# Patient Record
Sex: FEMALE | Race: OTHER | Employment: PART TIME | URBAN - METROPOLITAN AREA
[De-identification: names, ages, dates, MRNs, and addresses within clinical notes are randomized per-mention and may not be internally consistent; named-entity substitution may affect disease eponyms.]

---

## 2018-04-02 PROBLEM — I10 ESSENTIAL HYPERTENSION: Status: ACTIVE | Noted: 2018-04-02

## 2018-08-02 PROBLEM — I10 ESSENTIAL HYPERTENSION: Chronic | Status: ACTIVE | Noted: 2018-04-02

## 2019-02-14 PROBLEM — R31.29 MICROSCOPIC HEMATURIA: Status: ACTIVE | Noted: 2019-02-14

## 2021-11-02 ENCOUNTER — HOSPITAL ENCOUNTER (OUTPATIENT)
Dept: PHYSICAL THERAPY | Age: 41
Setting detail: THERAPIES SERIES
Discharge: HOME | End: 2021-11-02
Payer: COMMERCIAL

## 2021-11-02 PROCEDURE — 97110 THERAPEUTIC EXERCISES: CPT

## 2021-11-02 PROCEDURE — 97161 PT EVAL LOW COMPLEX 20 MIN: CPT

## 2021-11-02 PROCEDURE — 97140 MANUAL THERAPY 1/> REGIONS: CPT
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
## 2021-11-10 ENCOUNTER — HOSPITAL ENCOUNTER (OUTPATIENT)
Dept: PHYSICAL THERAPY | Age: 41
Setting detail: THERAPIES SERIES
Discharge: HOME | End: 2021-11-10
Payer: COMMERCIAL

## 2021-11-10 PROCEDURE — 97016 VASOPNEUMATIC DEVICE THERAPY: CPT

## 2021-11-10 PROCEDURE — 97110 THERAPEUTIC EXERCISES: CPT

## 2021-11-10 PROCEDURE — 97140 MANUAL THERAPY 1/> REGIONS: CPT

## 2021-11-10 NOTE — FLOWSHEET NOTE
Med/lat, superior/inferior                      Specific Instructions for next treatment: REVIEW HEP PRN, ERICKA, SHOULDER ISOMETRIC STRENGTHENING ALL PLANES, T BAND ROW AND PULL DOWNS, BALL ON TABLE FOR ROM, MANUAL STRETCHES AND MOBILIZATIONS OF L SHOULDER, MODALITIES FOR PAIN SUCH AS CP, VASO, ESTIM    Assessment: [] Progressing toward goals. [] No change. [] Other:    [x] Patient would continue to benefit from skilled physical therapy services in order to: INCREASE L UE STRENGTH AND ROM, DECREASED PAIN, IMPROVE POSTURE AND INCREASE FUNCTION. 11/10:  Began session with PROM stretches and manual therapy to L shoulder, L pec and L scapula to decrease tightness and to improve ROM. Added UT and levator stretch this session to improve cervical ROM. Patient reporting that she has always had decreased ROM with SB and rotation to R side. Completed session with vaso for edema and pain management. STG: (to be met in 12 treatments)  1. ? Pain: NO GREATER THAN 4/10 AT NIGHT FOR IMPROVED SLEEP  2. ? ROM: PROM SHOULDER FLEX =/> 140*  3. ? Strength: 3/5 L SHOULDER IN AVAILABLE ROM  4. ? Function: IMPROVE DASH RAW SCORE (42) BY 14  5. Independent with Home Exercise Programs  LTG: (to be met in TBD treatments)  1.    Patient goals: LESS PAIN , BE ABLE TO MOVE ARM    Pt. Education:  [x] Yes  [] No  [] Reviewed Prior HEP/Ed  Method of Education: [x] Verbal  [] Demo  [] Written  Comprehension of Education:  [x] Verbalizes understanding. [] Demonstrates understanding. [] Needs review. [] Demonstrates/verbalizes HEP/Ed previously given. Plan: [x] Continue per plan of care.    [] Other:      Treatment Charges: Mins Units   []  Modalities     [x]  Ther Exercise 15 1   [x]  Manual Therapy 30 2   []  Ther Activities     []  Aquatics     []  Neuromuscular     [x] Vasocompression 15 1   [] Gait Training     [] Dry needling        [] 1 or 2 muscles        [] 3 or more muscles     []  Other     Total Treatment time 60 4 Time In:0900            Time Out: 1000    Electronically signed by:  Ranjith Moore PTA

## 2021-11-12 ENCOUNTER — HOSPITAL ENCOUNTER (OUTPATIENT)
Dept: PHYSICAL THERAPY | Age: 41
Setting detail: THERAPIES SERIES
Discharge: HOME | End: 2021-11-12
Payer: COMMERCIAL

## 2021-11-12 PROCEDURE — 97140 MANUAL THERAPY 1/> REGIONS: CPT

## 2021-11-12 PROCEDURE — 97110 THERAPEUTIC EXERCISES: CPT

## 2021-11-12 NOTE — FLOWSHEET NOTE
[address] include 234 beds at the [facility] Outpatient Physical Therapy   [facility]   Phone: [phone]   Fax: [phone]    Physical Therapy Daily Treatment Note      Date:  2021  Patient Name:  Digna Wheeler    :  1980  MRN: [id number]  Physician: Arnoldo Grande MD  Insurance: NO INSURANCE INFORMATION AVAILABLE TO DATE  Medical Diagnosis/Rehab Codes:   M75.102 -R-C TER L SHOULDER  S42.272 FRACTURE UPPER LEFT HUMERUS  M25.512 (ICD-10-CM) - Pain in left shoulder  M24.812 -L SHOULDER STIFFNESS  M62.81 -MUSCLE WEAKNESS  Onset Date: 10/2/2021                      Next 's appt: 10/4/2021  Visit# / total visits:   Cancels/No Shows: 0/0    Subjective: PATIENT REPORTS TODAY THAT SHOULDER CONTINUES TO BE PAINFUL. DESCRIBED PAIN AS DULL ACHE THAT BECOMES MORE SHARP WITH MOVEMENT. Pain:  [x] Yes  [] No Location: L neck  Pain Rating: (0-10 scale) 6/10  Pain altered Tx:  [] No  [] Yes  Action:  Comments:    Objective:  Modalities: CONSIDER ESTIM, VASO AND/OR CP -PATIENT DECLINED VASO TODAY  Game Ready 11/10/21  - temp: 34 degrees   - location: L shoulder  - position:seated  - time: 15 minutes   - pressure level: Low     Precautions: NONE IDENTIFIED  Exercises: INSTRUCTION IN HEP 2021  Exercise Reps/ Time Weight/ Level Comments completed   PENDULUMS: CIRCLES, FWD-BACK AND LATERAL 10 EA   BID HEP    TABLE SLIDES: FLEXION, SCAPTION AND ABDUCTION 5\"X20 EA   BID HEP X   AAROM SHOULDER ER WITH WAND IN SITTING 5\"X10   BID HEP    SCAPULAR ADDUCTION 5\"X10   BID HEP; 11/10 15x               PROM 10 MIN TOTAL  FLEXION, ER, SCAPTION X   UT stretch 2x30\"      Levator stretch 2x30\"                    Other: ENCOURAGE THER IN ROM FOR STRETCHES WITH MINIMAL INCREASE IN PAIN. ENCOURAGED CONTINUED ICE SEVERAL TIMES A DAY. Manual Therapy  Time/reps:  Total time: 27'  Completed  Comments    distraction  X    Grade I oscillation to [facility] joint   Inferior and posterior   Trigger point   pec and sub scap Scapular mobilization  X Med/lat, superior/inferior                      Specific Instructions for next treatment: REVIEW HEP PRN, ERICKA, SHOULDER ISOMETRIC STRENGTHENING ALL PLANES, T BAND ROW AND PULL DOWNS, BALL ON TABLE FOR ROM, MANUAL STRETCHES AND MOBILIZATIONS OF L SHOULDER, MODALITIES FOR PAIN SUCH AS CP, VASO, ESTIM    Assessment: [x] Progressing toward goals. FOCUSED TODAY'S SESSION ON IMPROVING ROM WITH COMBINATION OF MANUAL AND AAROM EXERCISES. PAIN CONTINUES TO BE LIMITING FACTOR WITH ANY MOTION. PATIENT DECLINED VASO POST EXERCISES FOR PAIN MANAGEMENT. [] No change. [] Other:    [x] Patient would continue to benefit from skilled physical therapy services in order to: INCREASE L UE STRENGTH AND ROM, DECREASED PAIN, IMPROVE POSTURE AND INCREASE FUNCTION. STG: (to be met in 12 treatments)  1. ? Pain: NO GREATER THAN 4/10 AT NIGHT FOR IMPROVED SLEEP  2. ? ROM: PROM SHOULDER FLEX =/> 140*  3. ? Strength: 3/5 L SHOULDER IN AVAILABLE ROM  4. ? Function: IMPROVE DASH RAW SCORE (42) BY 14  5. Independent with Home Exercise Programs  LTG: (to be met in TBD treatments)  1.    Patient goals: LESS PAIN , BE ABLE TO MOVE ARM    Pt. Education:  [x] Yes  [] No  [] Reviewed Prior HEP/Ed  Method of Education: [x] Verbal  [] Demo  [] Written  Comprehension of Education:  [x] Verbalizes understanding. [] Demonstrates understanding. [] Needs review. [] Demonstrates/verbalizes HEP/Ed previously given. Plan: [x] Continue per plan of care.    [] Other:      Treatment Charges: Mins Units   []  Modalities     [x]  Ther Exercise 15 1   [x]  Manual Therapy 15 1   []  Ther Activities     []  Aquatics     []  Neuromuscular     [x] Vasocompression     [] Gait Training     [] Dry needling        [] 1 or 2 muscles        [] 3 or more muscles     []  Other     Total Treatment time 30 2     Time In: 9:20 AM  Time Out: 9:50 AM    Electronically signed by:  Morteza Kelley PTA

## 2021-11-16 ENCOUNTER — HOSPITAL ENCOUNTER (OUTPATIENT)
Dept: PHYSICAL THERAPY | Age: 41
Setting detail: THERAPIES SERIES
Discharge: HOME | End: 2021-11-16
Payer: COMMERCIAL

## 2021-11-16 PROCEDURE — 97140 MANUAL THERAPY 1/> REGIONS: CPT

## 2021-11-16 PROCEDURE — 97016 VASOPNEUMATIC DEVICE THERAPY: CPT

## 2021-11-16 PROCEDURE — 97110 THERAPEUTIC EXERCISES: CPT

## 2021-11-16 NOTE — FLOWSHEET NOTE
[address] [facility] Outpatient Physical Therapy   [address]   Phone: [phone]   Fax: [phone]    Physical Therapy Daily Treatment Note      Date:  2021  Patient Name:  Domenic Wilkerson    :  1980  MRN: [id number]  Physician: Mahogany Tyler MD  Insurance: [email or web address]; VISIT LIMIT 18 (PRE AUTH REQUIRED AFTER 18 VISITS)  Medical Diagnosis/Rehab Codes:   M75.102 -R-C TER L SHOULDER  S42.272 FRACTURE UPPER LEFT HUMERUS  M25.512 (ICD-10-CM) - Pain in left shoulder  M24.812 -L SHOULDER STIFFNESS  M62.81 -MUSCLE WEAKNESS  Onset Date: 10/2/2021                      Next 's appt: 10/4/2021  Visit# / total visits: 3/18  Cancels/No Shows: 0/0    Subjective: PATIENT REPORTS CONSTANT PAIN AND STATES SHE FEELS VERY STIFF COMING IN 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 PATIENT REPORTING INCREASED DIFFICULTY WITH TABLE SLIDES INTO FLEXION. PATIENT REPORTING DISCOMFORT IN [place]ICEPS REGION. Pain:  [x] Yes  [] No Location: L neck  Pain Rating: (0-10 scale) 6/10  Pain altered Tx:  [] No  [] Yes  Action:  Comments:    Objective:  Modalities: CONSIDER ESTIM, VASO AND/OR CP -PATIENT DECLINED VASO TODAY  Game Ready 2021  - temp: 34 degrees   - location: L shoulder  - position:seated  - time: 10minutes   - pressure level: Low     Precautions: NONE IDENTIFIED  Exercises: INSTRUCTION IN HEP 2021  Exercise Reps/ Time Weight/ Level Comments completed   PENDULUMS: CIRCLES, FWD-BACK AND LATERAL 10 EA   BID HEP    TABLE SLIDES: FLEXION, SCAPTION AND ABDUCTION 5\"X20 EA   BID HEP X   AAROM SHOULDER ER WITH WAND IN SITTING 5\"X10   BID HEP    SCAPULAR ADDUCTION 5\"X10   BID HEP;  15x X   SUPINE PEC STRETCH 2X 30\"    PT'S ARM OFF EOB HORIZONTAL ABD AS TOLERATED WITH MILD OVER PRESSURE.  X   PROM 10 MIN TOTAL  FLEXION, ER, SCAPTION, ABD, Retro/fwd shoulder rolls X   UT stretch 2x30\"      Levator stretch 2x30\"      Shoulder flexion with physio ball 10x 3\"   X Other: ENCOURAGE THER IN ROM FOR STRETCHES WITH MINIMAL INCREASE IN PAIN. ENCOURAGED CONTINUED ICE SEVERAL TIMES A DAY. Manual Therapy  Time/reps: Total time: 22'  Completed  Comments    distraction  X    Grade I oscillation to [address] joint  X Inferior and posterior   Trigger point  X pec and sub scap   Scapular mobilization  X Med/lat, superior/inferior                      Specific Instructions for next treatment: REVIEW HEP PRN, ERICKA, SHOULDER ISOMETRIC STRENGTHENING ALL PLANES, T BAND ROW AND PULL DOWNS, BALL ON TABLE FOR ROM, MANUAL STRETCHES AND MOBILIZATIONS OF L SHOULDER, MODALITIES FOR PAIN SUCH AS CP, VASO, ESTIM    Assessment: [x] Progressing toward goals. CONTINUED FOCUS ON ROM THIS SESSION. PATIENT DEMONSTRATES IMPROVED TOLERANCE TO SHOULDER ER AND ABD AND HAS NOTICED INCREASED DIFFICULTY WITH SHOULDER FLEXION AND SCAPTION. FOLLOWED WITH MANUAL THERAPY TO IMPROVE FLEXIBILITY AND ROM IN SHOULDER. CONTINUED TIGHTNESS NOTED IN SUBSCAPULARIS AND PEC MUSCLES. PATIENT ALSO REPORTING PAIN/DISCOMFORT WITH SHOULDER FLEXION IN THE ANTERIOR SHOULDER. POST MANUAL THERAPY, PATIENT REPORTING IMPROVED TOLERANCE TO ROM. EDUCATED PATIENT ON PEC STRETCH IN SUPINE WITH ASSISTANCE FOR LUE SUPPORT AS NEEDED. COMPLETED SESSION WITH VASO FOR PAIN AND EDEMA MANAGEMENT. PATIENT REPORTING 5/10 PAIN AT THE END OF SESSION. [] No change. [] Other:    [x] Patient would continue to benefit from skilled physical therapy services in order to: INCREASE L UE STRENGTH AND ROM, DECREASED PAIN, IMPROVE POSTURE AND INCREASE FUNCTION. STG: (to be met in 12 treatments)  1. ? Pain: NO GREATER THAN 4/10 AT NIGHT FOR IMPROVED SLEEP  2. ? ROM: PROM SHOULDER FLEX =/> 140*  3. ? Strength: 3/5 L SHOULDER IN AVAILABLE ROM  4. ? Function: IMPROVE DASH RAW SCORE (42) BY 14  5. Independent with Home Exercise Programs  LTG: (to be met in TBD treatments)  1.    Patient goals: LESS PAIN , BE ABLE TO MOVE ARM    Pt.  Education:  [x] Yes  [] No  [] Reviewed Prior HEP/Ed  Method of Education: [x] Verbal  [] Demo  [] Written  Comprehension of Education:  [x] Verbalizes understanding. [] Demonstrates understanding. [] Needs review. [] Demonstrates/verbalizes HEP/Ed previously given. Plan: [x] Continue per plan of care.    [] Other:      Treatment Charges: Mins Units   []  Modalities     [x]  Ther Exercise 15 1   [x]  Manual Therapy 25 2   []  Ther Activities     []  Aquatics     []  Neuromuscular     [x] Vasocompression 15 1   [] Gait Training     [] Dry needling        [] 1 or 2 muscles        [] 3 or more muscles     []  Other     Total Treatment time 55 4     Time In: 5736  Time Out:4225    Electronically signed by:  Ramin Iverson PTA

## 2021-11-19 ENCOUNTER — HOSPITAL ENCOUNTER (OUTPATIENT)
Dept: PHYSICAL THERAPY | Age: 41
Setting detail: THERAPIES SERIES
Discharge: HOME | End: 2021-11-19
Payer: COMMERCIAL

## 2021-11-19 PROCEDURE — 97110 THERAPEUTIC EXERCISES: CPT

## 2021-11-19 PROCEDURE — 97140 MANUAL THERAPY 1/> REGIONS: CPT

## 2021-11-19 PROCEDURE — 97016 VASOPNEUMATIC DEVICE THERAPY: CPT

## 2021-11-19 NOTE — FLOWSHEET NOTE
ROM FREQUENTLY TO MAX ROM TO AVOID FROZEN SHOULDER. Manual Therapy  Time/reps: Total time:7'  Completed  Comments    distraction  X    Grade 3 oscillation to [address] joint  X Inferior and posterior   Trigger point   pec and sub scap   Scapular mobilization   X IN S/L, Med/lat, superior/inferior, ROTATION                      Specific Instructions for next treatment: CONTINUE PULLEYS, ADD SHOULDER ISOMETRIC STRENGTHENING ALL PLANES, T BAND ROW AND PULL DOWNS, BALL ON TABLE FOR ROM, MANUAL STRETCHES AND MOBILIZATIONS OF L SHOULDER, MODALITIES FOR PAIN SUCH AS CP, VASO, ESTIM      HOME Exercise PROGRAM Reps/ Time Weight/ Level Comments   PENDULUMS: CIRCLES, FWD-BACK AND LATERAL 10 EA   BID HEP   TABLE SLIDES: FLEXION, SCAPTION AND ABDUCTION 5\"X20 EA   BID HEP   AAROM SHOULDER ER WITH WAND IN SITTING 5\"X10   BID HEP   SCAPULAR ADDUCTION 5\"X10   BID HEP   T BAND IR/ER 10 EA YELLOW BID HEP ADDED 11/19       Assessment: [x] Progressing toward goals. OBSERVED INCREASED ACTIVE AND PASSIVE ROM. SIGNIFICANT POSSIBILITY OF DEVELOPING ADHESIVE CAPSULITIS DUE TO POOR TOLERANCE OF SHOULDER ELEVATION >90*      [] No change. [] Other:    [x] Patient would continue to benefit from skilled physical therapy services in order to: INCREASE L UE STRENGTH AND ROM, DECREASED PAIN, IMPROVE POSTURE AND INCREASE FUNCTION. STG: (to be met in 12 treatments)  1. ? Pain: NO GREATER THAN 4/10 AT NIGHT FOR IMPROVED SLEEP  2. ? ROM: PROM SHOULDER FLEX =/> 140*  3. ? Strength: 3/5 L SHOULDER IN AVAILABLE ROM  4. ? Function: IMPROVE DASH RAW SCORE (42) BY 14  5. Independent with Home Exercise Programs  LTG: (to be met in TBD treatments)  1.    Patient goals: LESS PAIN , BE ABLE TO MOVE ARM    Pt. Education:  [x] Yes  [] No  [] Reviewed Prior HEP/Ed  Method of Education: [x] Verbal  [] Demo  [] Written  Comprehension of Education:  [x] Verbalizes understanding. [] Demonstrates understanding. [] Needs review.   [] Demonstrates/verbalizes HEP/Ed previously given. Plan: [x] Continue per plan of care. [] Other:      Treatment Charges: Mins Units   []  Modalities     [x]  Ther Exercise 25 2   [x]  Manual Therapy 15 1   []  Ther Activities     []  Aquatics     []  Neuromuscular     [x] Vasocompression 15 1   [] Gait Training     [] Dry needling        [] 1 or 2 muscles        [] 3 or more muscles     []  Other     Total Treatment time 55 4     Time In: 1100 Time Out:1200    Electronically signed by:   Saji Alamo PT

## 2021-11-23 ENCOUNTER — HOSPITAL ENCOUNTER (OUTPATIENT)
Dept: PHYSICAL THERAPY | Age: 41
Setting detail: THERAPIES SERIES
Discharge: HOME | End: 2021-11-23
Payer: COMMERCIAL

## 2021-11-23 PROCEDURE — 97140 MANUAL THERAPY 1/> REGIONS: CPT

## 2021-11-23 PROCEDURE — 97110 THERAPEUTIC EXERCISES: CPT

## 2021-11-23 NOTE — FLOWSHEET NOTE
509 [facility]   [address]   Phone: [phone]   Fax: [phone]    Physical Therapy Daily Treatment Note      Date:  2021  Patient Name:  Fabrice Nunez    :  1980  MRN: [id number]  Physician: Meliza Carey MD  Insurance: [employer]; VISIT LIMIT 18 (PRE AUTH REQUIRED AFTER 18 VISITS)  Medical Diagnosis/Rehab Codes:   M75.102 -R-C TER L SHOULDER  S42.272 FRACTURE UPPER LEFT HUMERUS  M25.512 (ICD-10-CM) - Pain in left shoulder  M24.812 -L SHOULDER STIFFNESS  M62.81 -MUSCLE WEAKNESS  Onset Date: 10/2/2021                      Next 's appt: 10/4/2021  Visit# / total visits:   Cancels/No Shows: 0/0    Subjective: PATIENT REPORTS SHE WENT TO [facility]THO YESTERDAY REPORTING ANOTHER INJECTION IN SHOULDER TOMORROW. PATIENT ALSO REPORTS SHE HAD IMAGING DONE YESTERDAY AND RESULTS: \"CHIP\" IN SHOULDER HAS NOT MOVED AND THERE ARE NO NEW PRECAUTIONS. RESIDENT STATES IF \"CHIP\" WERE TO MOVE THAT RTC WOULD COMPLETELY DETACH. PATIENT REPORTING THAT SHARP PAIN IS NOT AS INTENSE. Pain:  [x] Yes  [] No Location: L SHOULDER Pain Rating: (0-10 scale) 6/10  Pain altered Tx:  [] No  [x] Yes  Action: VASO, GENTLE ROM  Comments:    Objective:  Modalities: Game Ready:   - temp: 34 degrees   - location: L shoulder  - position:seated  - time: 15minutes   - pressure level: Low     Precautions: NONE IDENTIFIED  Exercises: INSTRUCTION IN HEP 2021  Exercise Reps/ Time Weight/ Level Comments completed   SUPINE PEC STRETCH 2X 30\"    PT'S ARM OFF EOB HORIZONTAL ABD AS TOLERATED WITH MILD OVER PRESSURE.     Pulley: flex/scap 2'/2'  Minimal range today X   T TUBING: ROWS, PULL DOWN, TRI PRESS 10X2 EA RED  INCREASED SETS X   T BAND IR/ER 10 EA YELLOW TOWEL AT WAIST,   ADDED TO HEP HEP   SHOULDER ISOMETRICS IN STANDING 10X 3\" HOLD  ADDED ; MOST PAIN WITH SHOULDER FLEXION X   UT stretch 5X10\"      Levator stretch 2x30\"      Shoulder flexion AND DIAGONALS with physio ball ON HILO TABLE 10x 3\" EA  RED BALL X   PRONE: SH EXT AND ROWS 10 EA  11/23: PATIENT REPORTING HARD STOP INTO SHOULDER EXTENSION. X   S/L ER 10X  TOWEL AT WAIST X   PROM/AAROM IN SUPINE 8'   X   SHOULDER PROTRACTION 10X  HAD TO SUPPORT L UE; 11/23 PATIENT REPORTING PAIN IN POSTERIOR CAMPSULE X     Other:   PATIENT EDUCATION NEED FOR ROM FREQUENTLY TO MAX ROM TO AVOID FROZEN SHOULDER. Manual Therapy  Time/reps: Total time:10' Completed  Comments    distraction  X    Grade 3 oscillation to [facility] joint  X Inferior and posterior   Trigger point   pec and sub scap   Scapular mobilization   X IN S/L, Med/lat, superior/inferior, ROTATION                      Specific Instructions for next treatment: CONTINUE PULLEYS, ADD SHOULDER ISOMETRIC STRENGTHENING ALL PLANES, T BAND ROW AND PULL DOWNS, BALL ON TABLE FOR ROM, MANUAL STRETCHES AND MOBILIZATIONS OF L SHOULDER, MODALITIES FOR PAIN SUCH AS CP, VASO, ESTIM      HOME Exercise PROGRAM Reps/ Time Weight/ Level Comments   PENDULUMS: CIRCLES, FWD-BACK AND LATERAL 10 EA   BID HEP   TABLE SLIDES: FLEXION, SCAPTION AND ABDUCTION 5\"X20 EA   BID HEP   AAROM SHOULDER ER WITH WAND IN SITTING 5\"X10   BID HEP   SCAPULAR ADDUCTION 5\"X10   BID HEP   T BAND IR/ER 10 EA YELLOW BID HEP ADDED 11/19             Assessment: [x] Progressing toward goals. PATIENT CONTINUES TO DEMONSTRATE LIMITED ROM THROUGHOUT SESSION AND NOTES MOST PAIN/DISCOMFORT WITH SHOULDER EXTENSION IN PRONE AND ISOMETRIC SHOULDER FLEXION. ADDED SHOULDER ISOMETRICS THIS SESSION WITH PAIN ONLY NOTED WITH SHOULDER FLEXION AS MENTIONED BEFORE.          [] No change. [] Other:    [x] Patient would continue to benefit from skilled physical therapy services in order to: INCREASE L UE STRENGTH AND ROM, DECREASED PAIN, IMPROVE POSTURE AND INCREASE FUNCTION. STG: (to be met in 12 treatments)  1. ? Pain: NO GREATER THAN 4/10 AT NIGHT FOR IMPROVED SLEEP  2. ? ROM: PROM SHOULDER FLEX =/> 140*  3. ? Strength: 3/5 L SHOULDER IN AVAILABLE ROM  4. ? Function: IMPROVE DASH RAW SCORE (42) BY 14  5. Independent with Home Exercise Programs  LTG: (to be met in TBD treatments)  1.    Patient goals: LESS PAIN , BE ABLE TO MOVE ARM    Pt. Education:  [x] Yes  [] No  [] Reviewed Prior HEP/Ed  Method of Education: [x] Verbal  [] Demo  [] Written  Comprehension of Education:  [x] Verbalizes understanding. [] Demonstrates understanding. [] Needs review. [] Demonstrates/verbalizes HEP/Ed previously given. Plan: [x] Continue per plan of care.    [] Other:      Treatment Charges: Mins Units   []  Modalities     [x]  Ther Exercise 50 3   [x]  Manual Therapy 10 1   []  Ther Activities     []  Aquatics     []  Neuromuscular     [x] Vasocompression     [] Gait Training     [] Dry needling        [] 1 or 2 muscles        [] 3 or more muscles     []  Other     Total Treatment time 60 4     Time In: 1600 Time Out: 1700    Electronically signed by:  Zack Levi PTA

## 2021-12-01 ENCOUNTER — HOSPITAL ENCOUNTER (OUTPATIENT)
Dept: PHYSICAL THERAPY | Age: 41
Setting detail: THERAPIES SERIES
Discharge: HOME | End: 2021-12-01
Payer: COMMERCIAL

## 2021-12-01 PROCEDURE — 97140 MANUAL THERAPY 1/> REGIONS: CPT

## 2021-12-01 PROCEDURE — 97110 THERAPEUTIC EXERCISES: CPT

## 2021-12-01 PROCEDURE — 97016 VASOPNEUMATIC DEVICE THERAPY: CPT

## 2021-12-01 NOTE — FLOWSHEET NOTE
509 [facility]   [address]   Phone: [phone]   Fax: [phone]    Physical Therapy Daily Treatment Note      Date:  2021  Patient Name:  Pradeep Martell    :  1980  MRN: [id number]  Physician: Yeny Pollard MD  Insurance: [employer]; VISIT LIMIT 18 (PRE AUTH REQUIRED AFTER 18 VISITS)  Medical Diagnosis/Rehab Codes:   M75.102 -R-C TER L SHOULDER  S42.272 FRACTURE UPPER LEFT HUMERUS  M25.512 (ICD-10-CM) - Pain in left shoulder  M24.812 -L SHOULDER STIFFNESS  M62.81 -MUSCLE WEAKNESS  Onset Date: 10/2/2021                      Next 's appt: 10/4/2021  Visit# / total visits:   Cancels/No Shows: 0/0    Subjective: PATIENT REPORTING SHE GOT THE INJECTION IN HER SHOULDER LAST WEEK AND STATES IT IS A DIFFERENT PAIN THIS TIME BUT IS ABLE TO RAISE HER L UE MORE. Pain:  [x] Yes  [] No Location: L SHOULDER Pain Rating: (0-10 scale) 4/10  Pain altered Tx:  [] No  [x] Yes  Action: VASO, GENTLE ROM  Comments:    Objective:  Modalities: Game Ready: 2021  - temp: 34 degrees   - location: L shoulder  - position:seated  - time: 10minutes   - pressure level: Low     Precautions: NONE IDENTIFIED  Exercises: INSTRUCTION IN HEP 2021  Exercise Reps/ Time Weight/ Level Comments completed   SUPINE PEC STRETCH 2X 30\"    PT'S ARM OFF EOB HORIZONTAL ABD AS TOLERATED WITH MILD OVER PRESSURE.     Pulley: flex/scap 2'/2'  Minimal range today  demos improved tolerance  X   Finger ladder 5x 5\" HOLDS   ADDED #13 THIS SESSION X   T TUBING: ROWS, PULL DOWN, TRI PRESS 10X2 EA RED  INCREASED SETS X   T BAND IR/ER 10 EA YELLOW TOWEL AT WAIST,   ADDED TO HEP HEP   SHOULDER ISOMETRICS IN STANDING 10X 3\" HOLD  : CONTINUED PAIN WITH SHOULDER FLEXION  X   UT stretch 5X10\"      Levator stretch 2x30\"      Shoulder flexion AND DIAGONALS with physio ball ON HILO TABLE 10x 3\" EA  RED BALL X   PRONE: SH EXT AND ROWS 10X2 EA  : PATIENT REPORTING SOFT END FEEL WITH SHOULDER EXTENSION X   S/L ER 10X  TOWEL AT WAIST X   PROM/AAROM IN SUPINE 5'  12/1 demos improved tolerance to ROM and improved ROM this session X   SHOULDER PROTRACTION 10X2  12/1: NO SUPPORT NEEDED THIS SESSION AND PAIN NOTED IN ANTERIOR SHOULDER X     Other:   PATIENT EDUCATION NEED FOR ROM FREQUENTLY TO MAX ROM TO AVOID FROZEN SHOULDER. Manual Therapy  Time/reps: Total time:15' Completed  Comments    distraction  X    Grade 3 oscillation to [facility] joint  X Inferior and posterior   Trigger point   pec and sub scap   Scapular mobilization   X IN S/L, Med/lat, superior/inferior, ROTATION   STM to long head of bicep  X                 Specific Instructions for next treatment: CONTINUE PULLEYS, ADD SHOULDER ISOMETRIC STRENGTHENING ALL PLANES, T BAND ROW AND PULL DOWNS, BALL ON TABLE FOR ROM, MANUAL STRETCHES AND MOBILIZATIONS OF L SHOULDER, MODALITIES FOR PAIN SUCH AS CP, VASO, ESTIM      HOME Exercise PROGRAM Reps/ Time Weight/ Level Comments   PENDULUMS: CIRCLES, FWD-BACK AND LATERAL 10 EA   BID HEP   TABLE SLIDES: FLEXION, SCAPTION AND ABDUCTION 5\"X20 EA   BID HEP   AAROM SHOULDER ER WITH WAND IN SITTING 5\"X10   BID HEP   SCAPULAR ADDUCTION 5\"X10   BID HEP   T BAND IR/ER 10 EA YELLOW BID HEP ADDED 11/19             Assessment: [x] Progressing toward goals. PATIENT DEMONSTRATING IMPROVE TOLERANCE TO ROM AND EXERCISES THIS SESSION. BEGAN SESSION WITH MANUAL THERAPY TO DECREASE TIGHTNESS AND TO ASSIST WITH IMPROVING ROM. ADDED STM TO L LONG HEAD OF BICEP TO IMPROVE MOBILITY. PATIENT REPORTING THAT THE AREA OF STM FELT MORE LOOSE THAN USUAL RESULTING IN IMPROVED TOLERANCE TO EXERCISES AND ROM. PATIENT CONTINUES TO HAVE PAIN WITH SHOULDER FLEXION MOVEMENTS. ADDED FINGER LADDER THIS SESSION TO IMPROVE ROM WITH MILD DISCOMFORT NOTED DURING EXERCISE. WILL CONTINUE TO PROGRESS PATIENT AS SHE CAN TOLERATE. [] No change.      [] Other:    [x] Patient would continue to benefit from

## 2021-12-03 ENCOUNTER — HOSPITAL ENCOUNTER (OUTPATIENT)
Dept: PHYSICAL THERAPY | Age: 41
Setting detail: THERAPIES SERIES
Discharge: HOME | End: 2021-12-03
Payer: COMMERCIAL

## 2021-12-03 PROCEDURE — 97016 VASOPNEUMATIC DEVICE THERAPY: CPT

## 2021-12-03 PROCEDURE — 97110 THERAPEUTIC EXERCISES: CPT

## 2021-12-03 NOTE — FLOWSHEET NOTE
509 [facility]   [address]   Phone: [phone]   Fax: [phone]    Physical Therapy Daily Treatment Note      Date:  12/3/2021  Patient Name:  Yoly Singh    :  1980  MRN: [id number]  Physician: Saloni Benson MD  Insurance: [employer]; VISIT LIMIT 18 (PRE AUTH REQUIRED AFTER 18 VISITS)  Medical Diagnosis/Rehab Codes:   M75.102 -R-C TER L SHOULDER  S42.272 FRACTURE UPPER LEFT HUMERUS  M25.512 (ICD-10-CM) - Pain in left shoulder  M24.812 -L SHOULDER STIFFNESS  M62.81 -MUSCLE WEAKNESS  Onset Date: 10/2/2021                      Next 's appt: 10/4/2021  Visit# / total visits:   Cancels/No Shows: 0/0    Subjective: Patient reporting she was very sore post last tx.     Pain:  [x] Yes  [] No Location: L SHOULDER Pain Rating: (0-10 scale) /510  Pain altered Tx:  [] No  [x] Yes  Action: VASO, GENTLE ROM  Comments:    Objective:  Modalities: Game Ready: 12/3/2021  - temp: 34 degrees   - location: L shoulder  - position:seated  - time: 10minutes   - pressure level: Low     Precautions: NONE IDENTIFIED  Exercises: INSTRUCTION IN HEP 2021  Exercise Reps/ Time Weight/ Level Comments completed   SUPINE PEC STRETCH 2X 30\"    PT'S ARM OFF EOB HORIZONTAL ABD AS TOLERATED WITH MILD OVER PRESSURE.    UBE 2'/2'  Fwd/retro X   Pulley: flex/scap 2'/2'  Minimal range today  X   Finger ladder 5x 5\" HOLDS   ADDED #13 THIS SESSION X   T TUBING: ROWS, PULL DOWN, TRI PRESS 10X2 EA RED  INCREASED SETS X   T BAND IR/ER 10 EA YELLOW TOWEL AT WAIST,   ADDED TO HEP HEP   SHOULDER ISOMETRICS IN STANDING 10X 3\" HOLD  : CONTINUED PAIN WITH SHOULDER FLEXION  X   UT stretch 5X10\"      Levator stretch 2x30\"      Shoulder flexion AND DIAGONALS with physio ball ON HILO TABLE 10x 3\" EA  BLUE BALL- INCREASED SIZE OF BALL TO PROMOTE INCREASE ROM X   PRONE: SH EXT AND ROWS 10X2 EA  : PATIENT REPORTING SOFT END FEEL WITH SHOULDER EXTENSION    S/L ER 10X  TOWEL AT WAIST    PROM/AAROM IN SUPINE 5'  12/1 demos improved tolerance to ROM and improved ROM this session X   SHOULDER PROTRACTION 10X2  12/1: NO SUPPORT NEEDED THIS SESSION AND PAIN NOTED IN ANTERIOR SHOULDER    Seated hula hoop #5 1x each direction  Seated at hi-low table adjusting table height to patient's tolerance while performing activity. To improve ROM. Pain noted with returning rings to start position X            Other:   PATIENT EDUCATION NEED FOR ROM FREQUENTLY TO MAX ROM TO AVOID FROZEN SHOULDER. Manual Therapy  Time/reps: Total time:5' Completed  Comments    distraction  X    Grade 3 oscillation to [address] joint   Inferior and posterior   Trigger point   pec and sub scap   Scapular mobilization   X IN S/L, Med/lat, superior/inferior, ROTATION   STM to long head of bicep  X                 Specific Instructions for next treatment: CONTINUE PULLEYS, ADD SHOULDER ISOMETRIC STRENGTHENING ALL PLANES, T BAND ROW AND PULL DOWNS, BALL ON TABLE FOR ROM, MANUAL STRETCHES AND MOBILIZATIONS OF L SHOULDER, MODALITIES FOR PAIN SUCH AS CP, VASO, ESTIM      HOME Exercise PROGRAM Reps/ Time Weight/ Level Comments   PENDULUMS: CIRCLES, FWD-BACK AND LATERAL 10 EA   BID HEP   TABLE SLIDES: FLEXION, SCAPTION AND ABDUCTION 5\"X20 EA   BID HEP   AAROM SHOULDER ER WITH WAND IN SITTING 5\"X10   BID HEP   SCAPULAR ADDUCTION 5\"X10   BID HEP   T BAND IR/ER 10 EA YELLOW BID HEP ADDED 11/19             Assessment: [x] Progressing toward goals. INCREASED PHYSIOBALL SIZE FOR SHOULDER FLEXION THIS DATE TO IMPROVE ROM. ADDED HULA HOOP EXERCISE TO IMPROVE ROM AND ACTIVITY TOLERANCE. PATIENT DEMONSTRATING DIFFICULTY WITH MOVING RINGS FROM L TO R SIDE (ADDUCTION). [] No change. [] Other:    [x] Patient would continue to benefit from skilled physical therapy services in order to: INCREASE L UE STRENGTH AND ROM, DECREASED PAIN, IMPROVE POSTURE AND INCREASE FUNCTION.     STG: (to be met in 12 treatments)  1. ? Pain: NO GREATER THAN 4/10 AT NIGHT FOR IMPROVED SLEEP  2. ? ROM: PROM SHOULDER FLEX =/> 140*  3. ? Strength: 3/5 L SHOULDER IN AVAILABLE ROM  4. ? Function: IMPROVE DASH RAW SCORE (42) BY 14  5. Independent with Home Exercise Programs  LTG: (to be met in TBD treatments)  1.    Patient goals: LESS PAIN , BE ABLE TO MOVE ARM    Pt. Education:  [x] Yes  [] No  [] Reviewed Prior HEP/Ed  Method of Education: [x] Verbal  [] Demo  [] Written  Comprehension of Education:  [x] Verbalizes understanding. [] Demonstrates understanding. [] Needs review. [] Demonstrates/verbalizes HEP/Ed previously given. Plan: [x] Continue per plan of care.    [] Other:      Treatment Charges: Mins Units   []  Modalities     [x]  Ther Exercise 35 2   [x]  Manual Therapy 5 -   []  Ther Activities     []  Aquatics     []  Neuromuscular     [x] Vasocompression 10 1   [] Gait Training     [] Dry needlinG        [] 1 or 2 muscles        [] 3 or more muscles     []  Other     Total Treatment time 50 3     Time In: 1000 Time Out: 1954    Electronically signed by:  Lorraine Armstrong PTA

## 2021-12-08 ENCOUNTER — HOSPITAL ENCOUNTER (OUTPATIENT)
Dept: PHYSICAL THERAPY | Age: 41
Setting detail: THERAPIES SERIES
Discharge: HOME | End: 2021-12-08
Payer: COMMERCIAL

## 2021-12-08 NOTE — FLOWSHEET NOTE
[x] [facility]) - [facility]  [address]  [place]: [phone]   F: [phone]     Physical Therapy Cancel/No Show note    Date: 2021  Patient: Leigh Luevano  : 1980  MRN: [id number]    Visit Count:   Cancels/No Shows to date:     For today's appointment patient:    [x]  Cancelled    [] Rescheduled appointment    [] No-show     Reason given by patient:    []  Patient ill    []  Conflicting appointment    [] No transportation      [] Conflict with work    [] No reason given    [] Weather related    [] COVID-19    [x] Other:      Comments: Per , patient had to cx session due to her child being sick.        [] Next appointment was confirmed    Electronically signed by: Megan Frank PTA

## 2021-12-10 ENCOUNTER — APPOINTMENT (OUTPATIENT)
Dept: PHYSICAL THERAPY | Age: 41
End: 2021-12-10
Payer: COMMERCIAL

## 2021-12-15 ENCOUNTER — HOSPITAL ENCOUNTER (OUTPATIENT)
Dept: PHYSICAL THERAPY | Age: 41
Setting detail: THERAPIES SERIES
Discharge: HOME | End: 2021-12-15
Payer: COMMERCIAL

## 2021-12-15 PROCEDURE — 97140 MANUAL THERAPY 1/> REGIONS: CPT

## 2021-12-15 PROCEDURE — 97110 THERAPEUTIC EXERCISES: CPT

## 2021-12-15 NOTE — FLOWSHEET NOTE
[facility]   [facility] [address]   Phone: [phone]   Fax: [phone]    Physical Therapy Daily Treatment Note      Date:  12/15/2021  Patient Name:  Kiko Zhang    :  1980  MRN: [id number]  Physician: Kellie Antonio MD  Insurance: [employer]; VISIT LIMIT 18 (PRE AUTH REQUIRED AFTER 18 VISITS)  Medical Diagnosis/Rehab Codes:   M75.102 -R-C TER L SHOULDER  S42.272 FRACTURE UPPER LEFT HUMERUS  M25.512 (ICD-10-CM) - Pain in left shoulder  M24.812 -L SHOULDER STIFFNESS  M62.81 -MUSCLE WEAKNESS  Onset Date: 10/2/2021                      Next 's appt: 10/4/2021  Visit# / total visits:   Cancels/No Shows: 1/0    Subjective: Patient reports that she is able to put her ponytail higher and that it just takes extra time to perform with mild discomfort. Patient also reporting tightness in biceps causing discomfort with movement.     Pain:  [x] Yes  [] No Location: L SHOULDER Pain Rating: (0-10 scale) 3/10  Pain altered Tx:  [] No  [x] Yes  Action: VASO, GENTLE ROM  Comments:    Objective:  Modalities: Game Ready: held 12/15/2021  - temp: 34 degrees   - location: L shoulder  - position:seated  - time: 10minutes   - pressure level: Low     Precautions: NONE IDENTIFIED  Exercises: INSTRUCTION IN HEP 2021  Exercise Reps/ Time Weight/ Level Comments completed   SUPINE PEC STRETCH 2X 30\"    PT'S ARM OFF EOB HORIZONTAL ABD AS TOLERATED WITH MILD OVER PRESSURE.    UBE 3'/3' 2 Fwd/retro; 12/15 increased time X   Pulley: flex/scap 2'/2'  Minimal range today  X   Finger ladder 5x 5\" HOLDS  12/15 #15 this session X   T TUBING: ROWS, PULL DOWN, TRI PRESS 10X2 EA GREEN  INCREASED SETS X   T BAND IR/ER 10X2 EA RED TOWEL AT WAIST,   ADDED TO HEP HEP   SHOULDER ISOMETRICS IN STANDING 10X 3\" HOLD  : CONTINUED PAIN WITH SHOULDER FLEXION     SHOULDER AROM 10x standing Flexion, scaption, ABD, extension with pain at end range with limited ROM noted this session X   UT stretch 5X10\"      Levator stretch 2x30\"      Shoulder flexion AND DIAGONALS with physio ball ON HILO TABLE 10x 3\" EA  BLUE BALL- INCREASED SIZE OF BALL TO PROMOTE INCREASE ROM X   PRONE: SH EXT AND ROWS 10X2 EA  12/1: PATIENT REPORTING SOFT END FEEL WITH SHOULDER EXTENSION    S/L ER 10X  TOWEL AT WAIST    PROM/AAROM IN SUPINE 5'  12/1 demos improved tolerance to ROM and improved ROM this session X   SHOULDER PROTRACTION: punches, CW/CCW 10X2  12/1: NO SUPPORT NEEDED THIS SESSION AND PAIN NOTED IN ANTERIOR SHOULDER    Seated hula hoop #5 1x each direction  12/15: performed at standard table this session with most discomfort felt with returning rings to starting position. X            Other:   PATIENT EDUCATION NEED FOR ROM FREQUENTLY TO MAX ROM TO AVOID FROZEN SHOULDER. Manual Therapy  Time/reps: Total time:15' Completed  Comments    distraction  X    Grade 3 oscillation to [facility] joint   Inferior and posterior   Trigger point   pec and sub scap   Scapular mobilization   X IN S/L, Med/lat, superior/inferior, ROTATION   STM to long head of bicep  X                 Specific Instructions for next treatment: CONTINUE PULLEYS, ADD SHOULDER ISOMETRIC STRENGTHENING ALL PLANES, T BAND ROW AND PULL DOWNS, BALL ON TABLE FOR ROM, MANUAL STRETCHES AND MOBILIZATIONS OF L SHOULDER, MODALITIES FOR PAIN SUCH AS CP, VASO, ESTIM      HOME Exercise PROGRAM Reps/ Time Weight/ Level Comments   PENDULUMS: CIRCLES, FWD-BACK AND LATERAL 10 EA   BID HEP   TABLE SLIDES: FLEXION, SCAPTION AND ABDUCTION 5\"X20 EA   BID HEP   AAROM SHOULDER ER WITH WAND IN SITTING 5\"X10   BID HEP   SCAPULAR ADDUCTION 5\"X10   BID HEP   T BAND IR/ER 10 EA YELLOW BID HEP ADDED 11/19             Assessment: [x] Progressing toward goals. PROGRESSED TB RESISTANCE THIS SESSION WITHOUT INCREASE IN PAIN OR DIFFICULTY. CUES AS NEEDED FOR PROPER POSTURE THROUGHOUT STANDING EXERCISES.   PATIENT DEMONSTRATES IMPROVE TOLERANCE TO HULA HOOP TASK THIS SESSION WITH DISCOMFORT FELT WHEN RETURNING RINGS TO STARTING POSITION, PRONATION TO SUPINATION/ABD. ADDED AROM THIS SESSION IN STANDING WITH PATIENT DEMONSTRATING IMPROVE ROM WITH PAIN AT END RANGE WITH SHOULDER FLEXION, SCAP AND ABD. [] No change. [] Other:    [x] Patient would continue to benefit from skilled physical therapy services in order to: INCREASE L UE STRENGTH AND ROM, DECREASED PAIN, IMPROVE POSTURE AND INCREASE FUNCTION. STG: (to be met in 12 treatments)  1. ? Pain: NO GREATER THAN 4/10 AT NIGHT FOR IMPROVED SLEEP  2. ? ROM: PROM SHOULDER FLEX =/> 140*  3. ? Strength: 3/5 L SHOULDER IN AVAILABLE ROM  4. ? Function: IMPROVE DASH RAW SCORE (42) BY 14  5. Independent with Home Exercise Programs  LTG: (to be met in TBD treatments)  1.    Patient goals: LESS PAIN , BE ABLE TO MOVE ARM    Pt. Education:  [x] Yes  [] No  [] Reviewed Prior HEP/Ed  Method of Education: [x] Verbal  [] Demo  [] Written  Comprehension of Education:  [x] Verbalizes understanding. [] Demonstrates understanding. [] Needs review. [] Demonstrates/verbalizes HEP/Ed previously given. Plan: [x] Continue per plan of care.    [] Other:      Treatment Charges: Mins Units   []  Modalities     [x]  Ther Exercise 45 3   [x]  Manual Therapy 15 1   []  Ther Activities     []  Aquatics     []  Neuromuscular     [] Vasocompression     [] Gait Training     [] Dry needlinG        [] 1 or 2 muscles        [] 3 or more muscles     []  Other     Total Treatment time 60 4     Time In: 0900  Time Out: 1000    Electronically signed by:  Megan Frank PTA

## 2021-12-17 ENCOUNTER — HOSPITAL ENCOUNTER (OUTPATIENT)
Dept: PHYSICAL THERAPY | Age: 41
Setting detail: THERAPIES SERIES
Discharge: HOME | End: 2021-12-17
Payer: COMMERCIAL

## 2021-12-17 PROCEDURE — 97140 MANUAL THERAPY 1/> REGIONS: CPT

## 2021-12-17 PROCEDURE — 97110 THERAPEUTIC EXERCISES: CPT

## 2021-12-17 NOTE — FLOWSHEET NOTE
[address] [facility]   [id number] [facility] [address]   Phone: [phone]   Fax: [phone]    Physical Therapy Daily Treatment Note      Date:  2021  Patient Name:  Ariane Banegas    :  1980  MRN: [id number]  Physician: Heike Garner MD  Insurance: [employer]; VISIT LIMIT 18 (PRE AUTH REQUIRED AFTER 18 VISITS)  Medical Diagnosis/Rehab Codes:   M75.102 -R-C TER L SHOULDER  S42.272 FRACTURE UPPER LEFT HUMERUS  M25.512 (ICD-10-CM) - Pain in left shoulder  M24.812 -L SHOULDER STIFFNESS  M62.81 -MUSCLE WEAKNESS  Onset Date: 10/2/2021                      Next 's appt: 10/4/2021  Visit# / total visits:   Cancels/No Shows: 1/0    Subjective: Patient reports to therapy with no new complaints and states she has difficulty with AROM with limited ROM and pain at end range.   Pain:  [] Yes  [] No Location: L SHOULDER Pain Rating: (0-10 scale) stiff-sore/10  Pain altered Tx:  [] No  [x] Yes  Action: VASO, GENTLE ROM  Comments:    Objective:  Modalities: Game Ready: held 12/15/2021  - temp: 34 degrees   - location: L shoulder  - position:seated  - time: 10minutes   - pressure level: Low     Precautions: NONE IDENTIFIED  Exercises: INSTRUCTION IN [facility] 2021  Exercise Reps/ Time Weight/ Level Comments completed   SUPINE PEC STRETCH 2X 30\"    PT'S ARM OFF EOB HORIZONTAL ABD AS TOLERATED WITH MILD OVER PRESSURE.    UBE 3'/3' 2 Fwd/retro; 12/15 increased time X   Pulley: flex/scap 2'/2'  Minimal range today  X   Finger ladder 5x 5\" HOLDS  12/15 #15 this session X   T TUBING: ROWS, PULL DOWN, TRI PRESS 10X2 EA GREEN  INCREASED SETS X   T BAND IR/ER 10X2 EA RED TOWEL AT WAIST,   ADDED TO HEP HEP   SHOULDER ISOMETRICS IN STANDING 10X 3\" HOLD  : CONTINUED PAIN WITH SHOULDER FLEXION     SHOULDER AROM 10x standing Flexion, scaption, ABD, extension with pain at end range with limited ROM noted this session X   UT stretch 5X10\"      Levator stretch 2x30\" Shoulder flexion AND DIAGONALS with physio ball ON HILO TABLE 10x 3\" EA  BLUE BALL- INCREASED SIZE OF BALL TO PROMOTE INCREASE ROM    PRONE: SH EXT AND ROWS 10X2 EA  12/1: PATIENT REPORTING SOFT END FEEL WITH SHOULDER EXTENSION    S/L ER 10X  TOWEL AT WAIST    PROM/AAROM IN SUPINE 5'  12/1 demos improved tolerance to ROM and improved ROM this session X   AAROM with Ronald       Flexion 10x 3\"  Added 12/17 X   ABD 10x 3\"  Added 12/17 X   Extension 10x 3\"  Added 12/17 X   SL ER 10x2  Added 12/17 X          SHOULDER PROTRACTION: punches, CW/CCW 10X2  12/17 demos improved tolerance but reports increased tightness in scap. X   Seated hula hoop #5 1x each direction  12/15: performed at standard table this session with most discomfort felt with returning rings to starting position. Other:   PATIENT EDUCATION NEED FOR ROM FREQUENTLY TO MAX ROM TO AVOID FROZEN SHOULDER. Manual Therapy  Time/reps: Total time:15' Completed  Comments    distraction  X    Grade 3 oscillation to [facility] joint  X Inferior and posterior   Trigger point  X pec and sub scap   Scapular mobilization   X IN S/L, Med/lat, superior/inferior, ROTATION   STM to long head of bicep   12/17 Patient reporting decreased tightness this date.                Specific Instructions for next treatment: CONTINUE PULLEYS, ADD SHOULDER ISOMETRIC STRENGTHENING ALL PLANES, T BAND ROW AND PULL DOWNS, BALL ON TABLE FOR ROM, MANUAL STRETCHES AND MOBILIZATIONS OF L SHOULDER, MODALITIES FOR PAIN SUCH AS CP, VASO, ESTIM      HOME Exercise PROGRAM Reps/ Time Weight/ Level Comments   PENDULUMS: CIRCLES, FWD-BACK AND LATERAL 10 EA   BID HEP   TABLE SLIDES: FLEXION, SCAPTION AND ABDUCTION 5\"X20 EA   BID HEP   AAROM SHOULDER ER WITH WAND IN SITTING 5\"X10   BID HEP   SCAPULAR ADDUCTION 5\"X10   BID HEP   T BAND IR/ER 10 EA YELLOW BID HEP ADDED 11/19             Assessment: [x] Progressing toward goals.  ADD SUPINE AAROM EXERCISES THIS SESSION TO IMPROVE PATIENT'S ROM AND ADDED SHOULDER ER IN SIDE LYING WITHOUT DIFFICULTY. PATIENT CONTINUES TO REPORT PAIN AT END RANGE INTO ANY DIRECTION. PATIENT REPORTING TIGHTNESS IN SUBSCAPULARIS REGION WHEN PERFORMING ROM AND ADDRESSED THIS REGION DURING MANUAL THERAPY TO IMPROVE SYMPTOMS. PATIENT DEMONSTRATES GOOD CARRY OVER OF INSTRUCTION REGARDING MAINTAINING GOOD UPRIGHT POSTURE.    [] No change. [] Other:    [x] Patient would continue to benefit from skilled physical therapy services in order to: INCREASE L UE STRENGTH AND ROM, DECREASED PAIN, IMPROVE POSTURE AND INCREASE FUNCTION. STG: (to be met in 12 treatments)  1. ? Pain: NO GREATER THAN 4/10 AT NIGHT FOR IMPROVED SLEEP  2. ? ROM: PROM SHOULDER FLEX =/> 140*  3. ? Strength: 3/5 L SHOULDER IN AVAILABLE ROM  4. ? Function: IMPROVE DASH RAW SCORE (42) BY 14  5. Independent with Home Exercise Programs  LTG: (to be met in TBD treatments)  1.    Patient goals: LESS PAIN , BE ABLE TO MOVE ARM    Pt. Education:  [x] Yes  [] No  [] Reviewed Prior HEP/Ed  Method of Education: [x] Verbal  [] Demo  [] Written  Comprehension of Education:  [x] Verbalizes understanding. [] Demonstrates understanding. [] Needs review. [] Demonstrates/verbalizes HEP/Ed previously given. Plan: [x] Continue per plan of care.    [] Other:      Treatment Charges: Mins Units   []  Modalities     [x]  Ther Exercise 45 3   [x]  Manual Therapy 15 1   []  Ther Activities     []  Aquatics     []  Neuromuscular     [] Vasocompression     [] Gait Training     [] Dry needlinG        [] 1 or 2 muscles        [] 3 or more muscles     []  Other     Total Treatment time 60 4     Time In: 1000  Time Out: 1100    Electronically signed by:  Ranjith Moore PTA

## 2021-12-22 ENCOUNTER — HOSPITAL ENCOUNTER (OUTPATIENT)
Dept: PHYSICAL THERAPY | Age: 41
Setting detail: THERAPIES SERIES
Discharge: HOME | End: 2021-12-22
Payer: COMMERCIAL

## 2021-12-22 PROCEDURE — 97110 THERAPEUTIC EXERCISES: CPT

## 2021-12-22 PROCEDURE — 97140 MANUAL THERAPY 1/> REGIONS: CPT

## 2021-12-30 ENCOUNTER — APPOINTMENT (OUTPATIENT)
Dept: PHYSICAL THERAPY | Age: 41
End: 2021-12-30
Payer: COMMERCIAL

## 2022-01-27 ENCOUNTER — HOSPITAL ENCOUNTER (OUTPATIENT)
Dept: PHYSICAL THERAPY | Age: 42
Setting detail: THERAPIES SERIES
Discharge: HOME | End: 2022-01-27

## 2022-01-27 NOTE — PROGRESS NOTES
Physical Therapy        [] [facility]) @ [facility]  [address]  [place], [address] [address] [place] [facility]  Phone [phone]  Fax [phone]    Physical Therapy Discharge Note    Date: 2022      Patient: Lynsey Eddy  : 1980  MRN: [id number]   Physician: Atilio Hogue MD  Medical Diagnosis/Rehab Codes:   M75.102 -R-C TER L SHOULDER  S42.272 FRACTURE UPPER LEFT HUMERUS  M25.512 (ICD-10-CM) - Pain in left shoulder  M24.812 -L SHOULDER STIFFNESS  M62.81 -MUSCLE WEAKNESS  Onset Date: 10/2/2021                    Cancels/No Shows 3/0   Total visits attended: 10                  [] Patient recovered from conditions. Treatment goals were met. [] Patient received maximum benefit. No further therapy indicated at this time. [] Patient demonstrated improvement from condition with  ** Of  ** Short term goals met. []Patient demonstrated improvement from condition with **   Of **  Long term goals met. [] Patient to continue exercise/home instructions independently. [x] Therapy interrupted due to:  PATIENT CANCELLED HER LAST 3 APPOINTMENT   [] Patient has 2 or more no shows/cancels, is discontinued per our policy. [] Patient has completed prescribed number of treatment sessions. [] Other:      Pain level at evaluation was       /10 and at discharge was       /10    It Is My Understanding That The:  [] Patient returned to work. [] Patient demonstrated improved level of function. [] Patient returned to previous functional level.   [] Patient's current functional status is unknown due to no-shows  [x] Other: UNPLANNED D/C, PATIENT DID NOT RETURN FOR THERAPY  Recommendations/Comments: HAS HOME EXERCISE PROGRAM      Treatment Included:     [x] Therapeutic Exercise   34878  [] Iontophoresis: 4 mg/mL Dexamethasone Sodium Phosphate  mAmin  64581   [] Therapeutic Activity  28167 [] Vasopneumatic cold with compression  35010    [] Gait Training   [phone] [] Ultrasound   [id number]   [] Neuromuscular Re-education  [id number] [] Electrical Stimulation Unattended  21738   [x] Manual Therapy  08850 [] Electrical Stimulation Attended  [id number]   [] Instruction in HEP  [] Lumbar/Cervical Traction  [id number]   [] Aquatic Therapy   [id number] [x] Cold/hotpack    [] Massage   [id number]      [] Dry Needling, 1 or 2 muscles  69532   [] Biofeedback, first 15 minutes   55962  [] Biofeedback, additional 15 minutes   24317 [] Dry Needling, 3 or more muscles  42932                If you have any questions or concerns regarding this patient's care, please contact us. Thank you for your referral.      Electronically signed by:  Maria Boyle PT

## 2022-12-27 DIAGNOSIS — E88.81 INSULIN RESISTANCE: Primary | ICD-10-CM

## 2022-12-27 RX ORDER — ONDANSETRON 2 MG/ML
8 INJECTION INTRAMUSCULAR; INTRAVENOUS
OUTPATIENT
Start: 2022-12-28

## 2022-12-27 RX ORDER — SODIUM CHLORIDE 9 MG/ML
INJECTION, SOLUTION INTRAVENOUS CONTINUOUS
OUTPATIENT
Start: 2022-12-28

## 2022-12-27 RX ORDER — EPINEPHRINE 1 MG/ML
0.3 INJECTION, SOLUTION, CONCENTRATE INTRAVENOUS PRN
OUTPATIENT
Start: 2022-12-28

## 2022-12-27 RX ORDER — ALBUTEROL SULFATE 90 UG/1
4 AEROSOL, METERED RESPIRATORY (INHALATION) PRN
OUTPATIENT
Start: 2022-12-28

## 2022-12-27 RX ORDER — ACETAMINOPHEN 325 MG/1
650 TABLET ORAL
OUTPATIENT
Start: 2022-12-28

## 2022-12-27 RX ORDER — DIPHENHYDRAMINE HYDROCHLORIDE 50 MG/ML
50 INJECTION INTRAMUSCULAR; INTRAVENOUS
OUTPATIENT
Start: 2022-12-28

## 2023-01-12 ENCOUNTER — HOSPITAL ENCOUNTER (OUTPATIENT)
Dept: INFUSION THERAPY | Age: 43
Setting detail: INFUSION SERIES
Discharge: HOME | End: 2023-01-12
Payer: COMMERCIAL

## 2023-01-12 VITALS
DIASTOLIC BLOOD PRESSURE: 98 MMHG | OXYGEN SATURATION: 99 % | TEMPERATURE: 97.5 F | RESPIRATION RATE: 16 BRPM | HEART RATE: 68 BPM | SYSTOLIC BLOOD PRESSURE: 159 MMHG

## 2023-01-12 DIAGNOSIS — E88.81 INSULIN RESISTANCE: Primary | ICD-10-CM

## 2023-01-12 LAB — CORTISOL: 19.7 UG/DL (ref 2.7–18.4)

## 2023-01-12 PROCEDURE — 6360000002 HC RX W HCPCS: Performed by: INTERNAL MEDICINE

## 2023-01-12 PROCEDURE — 82533 TOTAL CORTISOL: CPT

## 2023-01-12 PROCEDURE — 2580000003 HC RX 258: Performed by: INTERNAL MEDICINE

## 2023-01-12 PROCEDURE — 96374 THER/PROPH/DIAG INJ IV PUSH: CPT

## 2023-01-12 PROCEDURE — 36415 COLL VENOUS BLD VENIPUNCTURE: CPT

## 2023-01-12 RX ORDER — FLUCONAZOLE 150 MG/1
TABLET ORAL
COMMUNITY
Start: 2022-10-30

## 2023-01-12 RX ORDER — NITROFURANTOIN 25; 75 MG/1; MG/1
CAPSULE ORAL
COMMUNITY
Start: 2022-12-10

## 2023-01-12 RX ORDER — EPINEPHRINE 1 MG/ML
0.3 INJECTION, SOLUTION, CONCENTRATE INTRAVENOUS PRN
OUTPATIENT
Start: 2023-01-12

## 2023-01-12 RX ORDER — ZOLPIDEM TARTRATE 10 MG/1
TABLET ORAL
COMMUNITY
Start: 2022-10-13

## 2023-01-12 RX ORDER — ONDANSETRON 2 MG/ML
8 INJECTION INTRAMUSCULAR; INTRAVENOUS
OUTPATIENT
Start: 2023-01-12

## 2023-01-12 RX ORDER — ACETAMINOPHEN 325 MG/1
650 TABLET ORAL
OUTPATIENT
Start: 2023-01-12

## 2023-01-12 RX ORDER — SODIUM CHLORIDE 9 MG/ML
INJECTION, SOLUTION INTRAVENOUS CONTINUOUS
OUTPATIENT
Start: 2023-01-12

## 2023-01-12 RX ORDER — ALBUTEROL SULFATE 90 UG/1
4 AEROSOL, METERED RESPIRATORY (INHALATION) PRN
OUTPATIENT
Start: 2023-01-12

## 2023-01-12 RX ORDER — DIPHENHYDRAMINE HYDROCHLORIDE 50 MG/ML
50 INJECTION INTRAMUSCULAR; INTRAVENOUS
OUTPATIENT
Start: 2023-01-12

## 2023-01-12 RX ADMIN — COSYNTROPIN 1 MCG: 0.25 INJECTION, POWDER, LYOPHILIZED, FOR SOLUTION INTRAVENOUS at 10:18

## 2023-01-12 NOTE — PROGRESS NOTES
Pt arrived for Cortrosyn stim test.  Vitals obtained and PIV started in L AC. Cortrosyn given slow IV push. Labs drawn 30 minutes later. Pt tolerated well. Vitals remained stable as charted. PIV removed. Pt discharged home, ambulatory per self.

## 2023-07-07 ENCOUNTER — HOSPITAL ENCOUNTER (OUTPATIENT)
Dept: MRI IMAGING | Facility: CLINIC | Age: 43
Discharge: HOME | End: 2023-07-07
Payer: COMMERCIAL

## 2023-07-07 DIAGNOSIS — K83.8 OTHER SPECIFIED DISEASES OF BILIARY TRACT: ICD-10-CM

## 2023-07-07 PROCEDURE — 74181 MRI ABDOMEN W/O CONTRAST: CPT
